# Patient Record
(demographics unavailable — no encounter records)

---

## 2024-11-12 NOTE — REVIEW OF SYSTEMS
[Palpitations] : palpitations [Feeling Fatigued] : not feeling fatigued [SOB] : no shortness of breath [Dyspnea on exertion] : not dyspnea during exertion [Chest Discomfort] : no chest discomfort [Lower Ext Edema] : no extremity edema [Orthopnea] : no orthopnea [PND] : no PND [Syncope] : no syncope [Dizziness] : no dizziness [Easy Bleeding] : no tendency for easy bleeding [Easy Bruising] : no tendency for easy bruising [Negative] : Heme/Lymph

## 2024-11-12 NOTE — HISTORY OF PRESENT ILLNESS
[FreeTextEntry1] : Mr. Paulson is a pleasant 62-year-old male here for follow up today. The patient has a history of Tetralogy of Fallot s/p surgical repair (VSD repair and mechanical replacement of aortic and pulmonic valves, on coumadin)), ventricular tachycardia s/p ICD, LV dysfunction, HTN, asthma, JAYJAY and frequent PVCs s/p ablation.  To summarize, the patient has undergone multiple attempted ablations/EP studies for ventricular arrhythmias in the past without success. He was on tikosyn for his ventricular arrhythmias and had been experiencing palpitations which became worse after using inhalers for asthma. He has had an ICD since 2014.  He wore an outpatient Holter monitor which showed a PVC burden of 7%. TTE reported an EF of 35-40%. Subsequently, the patient underwent ablation for PVCs with me on 8/29/22. Multiple different morphologies were seen during the procedure (left and right bundle morphologies). Only the most frequent morphology was targeted (LBBB superior axis) which was successfully ablated from the inferior portion of the RV apex. The patient wore a Holter monitor after the ablation procedure which showed a lower PVC burden of 1.6% and NSVT. We discussed that if in the future symptoms became worse could pursue a more extensive procedure for PVCs/VT with RV and LV Scar mapping and homogenization to target all PVCs and VT.   Today, the patient reports to be doing well. He notes only rare brief palpitations. He denies dizziness, SOB or chest pain. He is on tikosyn 250 mcg bid which he is tolerating well.

## 2024-11-12 NOTE — DISCUSSION/SUMMARY
[FreeTextEntry1] : In summary, the patient is a 62-year-old male with a history of Tetralogy of Fallot s/p surgical repair (VSD repair and mechanical replacement of aortic and pulmonic valves), ventricular tachycardia s/p ICD, LV dysfunction, HTN, asthma, JAYJAY and frequent PVCs s/p ablation. He is doing well. Device interrogation today reveals normal function with no significant arrhythmias (est PVC burden 1%). Battery is at 3 months to SHUN. Will schedule follow up in 3 months to reassess battery. He will continue tikosyn and carvedilol, as well as coumadin and other HF medications.  [EKG obtained to assist in diagnosis and management of assessed problem(s)] : EKG obtained to assist in diagnosis and management of assessed problem(s)

## 2024-11-12 NOTE — CARDIOLOGY SUMMARY
[de-identified] : 11/12/24: sinus rhythm, RBBB 8/21/24: SR 60bpm, Elliot 242ms, qrs 171, qtc 472ms [de-identified] : 8/26/24: LVEF 25-30% Mild grade 1 LV diastolic dysfunction, LA severely dilated. RA dilated. Mild mitral regurgitation.  12/7/23 TTE:1. Left ventricular cavity is mildly dilated. Abnormal septal motion consistent with right ventricular pacemaker. Left ventricular systolic function is moderately to severely decreased with an ejection fraction of 42 % by Miller's method of disks with an ejection fraction visually estimated at 25 to 30 %. Global left ventricular hypokinesis. 2. The left atrium is normal. 3. The right atrium is normal in size. 4. Mildly enlarged right ventricular cavity size and mildly reduced systolic function. 5. Aortic root at the sinuses of Valsalva is dilated, measuring 4.50 cm (indexed 2.59 cm/m). Ascending aorta diameter is dilated, measuring 4.40 cm (indexed 2.53 cm/m). 6. A mechanical valve replacement present in the aortic position. 7. Thickened mitral valve leaflets. 8. Trace mitral regurgitation. 9. Mechanical valve is present. and [employer] prosthesis in the pulmonic position. 10. Mild tricuspid regurgitation. 11. Estimated pulmonary artery systolic pressure is 46 mmHg, consistent with moderate pulmonary hypertension. 12. Device lead is visualized in the right ventricle. 13. No pericardial effusion seen.

## 2024-11-13 NOTE — HISTORY OF PRESENT ILLNESS
[Never] : never [TextBox_4] : on Trelegy 200 no sig rescue or wheezing Dyspnea baseline never smoker no fevers, chills, chests pain post  cardiac rehab, not requiring 02  Saw Dr Alvares, not good transplant candidate

## 2024-11-13 NOTE — CONSULT LETTER
[Dear  ___] : Dear  [unfilled], [Consult Letter:] : I had the pleasure of evaluating your patient, [unfilled]. [Please see my note below.] : Please see my note below. [Sincerely,] : Sincerely, [FreeTextEntry3] : Everett Flower DO [facility]\par  Pulmonary Critical Care\par  Director Pulmonary Division\par  Medical Director Respiratory Therapy\par  [facility]\par  \par

## 2024-11-13 NOTE — DISCUSSION/SUMMARY
[FreeTextEntry1] : Severe persistent asthma, remodeled component, minimal allergies , JAYJAY ( mild AHI 5)- does not want CPAP Decreased movement of R hemidiaphragm, no paradoxical movement Hx Tetralogy repair, AVR,PVR, on full AC, HFrEF, ventricular ectopy, AICD, on GDMT, and coumadin Currently at baseline, Minimal benefit from prednisone per pt, no sig change on spirometry Continue Trelegy 200 clinically, no sig rescue or exacerbations,  Discussed  biologic, he refuses currently, repeat asthma profile noted No current bronchospasm on exam, normal sp02,  No active rhinitis/GERD- minimal allergies- ( cat and mold- discussed, IgE nl) CT scan 11/22, no sig bronchiectasis or nodules, never smoker, chronic elevated hemidiaphragm Dr Alvares input noted, high risk transplant , followed closely by Cardiology Vaccinations reviewed 4 months or sooner if needed, action plan reviewed, CXR and domitila at follow up

## 2024-11-13 NOTE — PHYSICAL EXAM
[No Acute Distress] : no acute distress [Normal Appearance] : normal appearance [No Resp Distress] : no resp distress [No Acc Muscle Use] : no acc muscle use [Normal Rhythm and Effort] : normal rhythm and effort [Clear to Auscultation Bilaterally] : clear to auscultation bilaterally [No Abnormalities] : no abnormalities [Normal Gait] : normal gait [No Clubbing] : no clubbing [No Cyanosis] : no cyanosis [No Edema] : no edema [FROM] : FROM [Normal Color/ Pigmentation] : normal color/ pigmentation [No Focal Deficits] : no focal deficits [Oriented x3] : oriented x3 [Normal Affect] : normal affect

## 2025-01-10 NOTE — DISCUSSION/SUMMARY
[FreeTextEntry1] : # HFrEF - ETIOLOGY: congenital heart disease s/p 3 prior sternotomies - TOF repair (age 6), [facility] AVR (age 28, 4/11/1990), [facility] MVR (9/24/2002).  -Will order repeat surveillance echo.  - GDMT:  - continue on low-dose Entresto, carvedilol 12.5mg BID, and Jardiance 10mg daily.   -Spironolactone (stopped due to hyperkalemia).  On last visit we reintroduced 25 mg every other day. 2 weeks post lab check with K of 4.6.  However, on routine labs done earlier this week K was elevated to 5.5. HOLD spironolactone.  Repeat blood test on Tuesday.  If level okay can consider spironolactone half tab (12.5mg) every other day. -BP fluctuates from SBP .   -Does not have much room for GDMT up titration.  - DIURETICS: not on standing diuretics.  Euvolemic on exam today.    - LABS: -  1/10/2024 show K 5.7 and Cr 1.71.  Labs prior showed K 6 and Cr 1.56. -5/1/2024: K5.2, creatinine 1.40, proBNP 918 -8/21/2024: K4.6, creatinine 1.27, proBNP 1088. -9/5/2024: K 5.2, Cr 1.48, NT proBNP 878. - 1/8/25: K 5.5, Cr 1.43, pro BNP.  Lipid panel , , HDL 46, LDL 50.  - ADVANCED THERAPIES:   He is has had progressive drop in his ejection fraction despite VT ablation and stable valve function.  He has limitations in his quality of life but no recent HFH.  He is unable to undergo full RHC due to mechanical PVR in place.  He did undergo CPET with high-risk features including low VO2 of 8.4ml/kg/min but preserved Ve/VCO2 slow of 26.  He has a depressed FEV1 of 0.8.  Based on this, he would be in need of dual heart-lung transplant.  However, he would be a high-risk candidate for transplant given that this will be his fourth redo sternotomy.   Also, I am not totally convinced that this would change his current quality of life much.  Evaluated by lung transplant team on 1/22/24 who are in agreement.  This time we will not proceed with opening an advanced therapies evaluation. -Today, he had performed well on a 6-minute walk test walking 911 m with Jason 0. - He saw [facility] team (Dr. Tuttle).  He would not be a candidate for RHC due to mechanical PV.  Follow-up with congenital team annually.  - REHAB: Completed cardiac rehab.  Now doing home exercise regimen.  He has only been able to get his heart rate into the 90s.  However, this was similar on his CPET done in fall 2023.  # VT - s/p ablation on 8/29/22 with Dr. Boles.  Remains on tikosyn.  Stable now from an arrhythmia perspective.    # Chromic remodelled asthma - Last PFTs 2/15/23: FEV 1 0.62, FEV/FVC ratio 57.8%.   - followed by pulmonology Dr. Flower.  On Trelegy.  Has not had to use his albuterol inhaler.  #Hemidiaphragm paralysis -Was seen by lung transplant and pulmonology teams.  No clear role for plication therapy.  Follows closely with pulmonology.  #JAYJAY -Followed by pulmonology.  Mild, patient does not want CPAP.  F/U in 6 months. [EKG obtained to assist in diagnosis and management of assessed problem(s)] : EKG obtained to assist in diagnosis and management of assessed problem(s)

## 2025-01-10 NOTE — PHYSICAL EXAM
[No Edema] : no edema [No Rash] : no rash [Normal] : alert and oriented, normal memory [de-identified] : JVP at clavicle. [de-identified] : mechanical S1 and S2, systolic murmur heard at apex. [de-identified] : warm

## 2025-01-10 NOTE — HISTORY OF PRESENT ILLNESS
[FreeTextEntry1] : 63 yo M with history of congenital heart disease with Tetralogy of Fallot s/p surgical VSD repair at age 6 followed by mechanical AVR at age 28 and subsequent mechanical PVR 8/24/22, ICD implant on 2/28/2001, VT with prior shocks s/p ablation 8/29/2022, HTN, chronic remodeled asthma, and JAYJAY presents for HF follow up.  He underwent initial correction with VSD repair at age 6.  He required aortic valve revision with mechanical AVR placement at [facility] on 4/11/1990 with Dr. Chip Nielsen at age 28.  He later went on to require additional mechanical pulmonic valve replacement with Dr. Fidel Wang on 8/24/2022.  His CHD has been complicated by ventricular arrhythmias.  These began at the age of 18.  ICD was implanted on 2/28/2001. He experienced his first shock approximately 10 years ago while he was golfing [place] a golf course.  He has undergone multiple attempted ablations without success and was maintained on Tikosyn.  However, he had worsening PVC burden (7% on Holter) and underwent ablation on 8/29/2020 (LBBB superior axis morphology ablated).  Post ablation Holter monitor showed reduced burden to 1.6%.  His ejection fraction since 2017 was mildly depressed at 40 to 45%.  However, post ablation, echocardiogram in June 2023 revealing LV function of 20 to 25%.  His echoes have also demonstrated at least moderate RV dysfunction with normal functioning mechanical valves.  7/25/23: I first met Mr. Paulson.  At that time, I had stopped his K supplements and started on spironolactone.  K post change was stable at 4.7 with Cr of 1.46 (around baseline).  He has since started cardiac rehab at [facility].  He had one day with SBP around 80 prior to cardiac rehab that improved to 90s after exercise.   10/9/23: Coreg CR 80mg daily was decreased to 25mg BID.  This had to be further decreased to 12.5mg BID due to hypotension at cardiac rehab.  Repeat echo and CPET were arranged.  11/27/23: CPET reviewed with poor prognostic markers but a suboptimal test.  MPHR 58%, METS 2.4, peak VO2 8.4ml/kg/min, VE/VCO2 slope 26, OUES 1.01L/min (reduced).  He was referred to Dr. King and [facility] team for additional work up who saw him on 1/3/24.   1/17/24: Was seen by congenital team and referred for coronary CTA which came back with minimal CAC score (10).  There was concern of possible hemidiaphragm paresis and the role for possible plication.  Was thought to be too high risk for lung transplant and will follow-up with pulmonology.  No changes to medications were made at that time.  2/21/2024.  At that time he was feeling well.  He unfortunately was unable to reenroll in cardiac rehab.  5/24/24: no meds changes doing well.  He has since bought his own treadmill, stationary bike as well as hand/foot pedal.    8/23/24: spironolactone reintroduced at 25mg every other day.  K 5.2 after starting this.  Since our last visit, patient has been feeling well.  He has had no fluid buildup.  He did a 6MWT in office today and walked for 911m, Jason 0, no stops, HR 72, O2 97%.  He recently got eye laser surgery and is due for an upcoming battery change of his ICD.  He has has EP visit in February.  He has been building his own exercise routine at home.  He is now increase this to: AM: 30 min on treadmill at 3 mph - increased from last visit (8/2024) where he was doing 10 minutes of treadmill at 2.5 mph and then 20mg at 3mph.  PM: 20 minutes of stationary bike and 10 minutes of hand/foot pedal each and light 5lbs weights (newly added). He does over 8000 steps per day.   During the day he is taking multiple walks and is trying to stay active playing pool at his house.  In summer he was walking in his pool with weighted weights.  He has brought a heart rate monitor notes his heart rate does not go up beyond 90s.  The highest was 99 but he does sweat during his work out.  He denies chest pain, palpitations, dyspnea at rest or exertion, orthopnea, leg swelling, changes in appetite, changes in weight, bendopnea, lightheadedness, dizziness, and syncope/pre-syncope.  He continues to sleep on a baseline of 2 pillows.  He has good appetite.  He has not had to use his albuterol inhaler.  He previously worked as a title researcher.  No children. Lives with his brother.

## 2025-01-10 NOTE — CARDIOLOGY SUMMARY
[de-identified] : 1/10/2025: NSR 65, RBBB, possible anterior infarct, nonspecific T wave changes. 4/5/2024: Low voltage, NSR with first-degree AV block, HR 72, left axis deviation, RBBB 9/8/23: Sinus bradycardia HR 40s, LAD, RBBB. 4/26/23: NSR with 1st degree AVB, , RBBB HR 74. [de-identified] : 8/23/2024: LVEF 5025 to 30%, LVEDD 5.0 cm, IVSd 1.3's centimeters, normally functioning valves, sinus Valsalva dilated at 4.5 cm, trace MR. 12/7/2023: LVEF visually 25 to 30% however measured at 42%, LVEDD 4.6 cm, IVSD 0.8 cm, mild RV dysfunction, PASP 46 6/1/2023: LVEF 25-30%, LVEDD 4.8cm, IVSd 1.7cm, PWd 1.4cm, PASP 49mmHg, mild RV dilation and mod - severe RV dysfunction, SOV 4.7cm (indexed 2.6), prox AO 4.60 (indexed 2.54), mild MR, mild TR, normal functioning AVR and PVR. 5/4/22: LVEF 35-40%, LVEDD 4.7cm, IVSd 1.8cm, PWD 1.5cm, PASP 38mmHg, low normal RV function, mild MR, mod Tr, normal functioning AVR and PVR. 2/4/2021: LVEF 35 to 40%, LVEDD 4.2, IVSD 1.5, PWD 1.2 cm, low normal RV function, moderate TR, mild to moderate dilation of aortic root, MVR and AVR functioning well. SHERICE 12/26/2019: intact intra atrial septum with bubble study. 10/29/2017: LVEF 40-45%, LVEDD 4.8cm, mild RV enlargement, normal valve function. [de-identified] : \par  [employer] ICD implanted 2/28/2001\par  Interrogation 2/23/23: no afib,  < 0.1% [de-identified] : \par  [facility] 10/20/2017: no obstructive CAD.\par  RH 10/20/2017: RA 12, RV 40/13 - unable to cross mechanical pulmonic valve.   [de-identified] : 6MWT 1/10/25: 911m, Jason 0, no stops, HR 72, O2 97%.  CPET 11/17/23: MPHR 58%, METS 2.4, peak VO@ 8.4ml/kg/min, VE/VCO2 slope 26, OUES 1.01L/min (reduced), FEV 1 0.82

## 2025-01-10 NOTE — REASON FOR VISIT
[Cardiac Failure] : cardiac failure [Congenital Heart Disease] : congenital heart disease [FreeTextEntry1] : General cardiologist: Dr. Howard EP: Dr. Boles

## 2025-01-10 NOTE — ASSESSMENT
[FreeTextEntry1] : 61 yo M with history of congenital heart disease with Tetralogy of Fallot s/p surgical VSD repair at age 6 followed by mechanical AVR at age 28 and subsequent mechanical PVR 8/24/22, ICD implant on 2/28/2001, VT with prior shocks s/p ablation 8/29/2022, HTN, chronic remodelled asthma, and JAYJAY presents for HF follow up.  He has ACC/AHA stage D CM, with NYHA class II symptoms.

## 2025-02-12 NOTE — REVIEW OF SYSTEMS
[Fever] : no fever [Chills] : no chills [SOB] : no shortness of breath [Syncope] : no syncope [Abdominal Pain] : no abdominal pain [Urinary Frequency] : no change in urinary frequency [Confusion] : no confusion was observed [Easy Bleeding] : no tendency for easy bleeding

## 2025-02-12 NOTE — PHYSICAL EXAM
[Normal] : well developed, well nourished, no acute distress [Normal Conjunctiva] : normal conjunctiva [Normal Venous Pressure] : normal venous pressure [Normal S1, S2] : normal S1, S2 [Clear Lung Fields] : clear lung fields [Good Air Entry] : good air entry [No Respiratory Distress] : no respiratory distress  [Soft] : abdomen soft [Normal Gait] : normal gait [No Edema] : no edema [Moves all extremities] : moves all extremities [No Focal Deficits] : no focal deficits [Normal Speech] : normal speech [Alert and Oriented] : alert and oriented

## 2025-02-12 NOTE — DISCUSSION/SUMMARY
[FreeTextEntry1] : 61M with pmhx Tetralogy of Fallot s/p surgical repair (VSD repair and mechanical replacement of aortic and pulmonic valves) on coumadin, ventricular tachycardia s/p ICD, LV dysfunction, HTN, asthma, JAYJAY and frequent PVCs s/p ablation, presents today for follow up of time to SHUN on ICD. Return to office in 6 weeks to re-eval time to SHUN, or sooner if pt hears device trigger - pt aware and agreeable to call if that happens. Continue tikosyn. Follow up in 6 weeks or sooner prn.  [EKG obtained to assist in diagnosis and management of assessed problem(s)] : EKG obtained to assist in diagnosis and management of assessed problem(s)

## 2025-02-12 NOTE — HISTORY OF PRESENT ILLNESS
[FreeTextEntry1] : 61M with pmhx Tetralogy of Fallot s/p surgical repair (VSD repair and mechanical replacement of aortic and pulmonic valves) on coumadin, ventricular tachycardia s/p ICD, LV dysfunction, HTN, asthma, JAYJAY and frequent PVCs s/p ablation, presents today for follow up of time to SHUN on ICD.  To summarize, the patient has undergone multiple attempted ablations/EP studies for ventricular arrhythmias in the past without success. He was on tikosyn for his ventricular arrhythmias and had been experiencing palpitations which became worse after using inhalers for asthma. He wore an outpatient Holter monitor which showed a PVC burden of 7%. TTE reported an EF of 35-40%. Subsequently, the patient underwent ablation for PVCs on 8/29/22. Multiple different morphologies were seen during the procedure (left and right bundle morphologies). Only the most frequent morphology was targeted (LBBB superior axis) which was successfully ablated from the inferior portion of the RV apex. The patient wore a Holter monitor after the ablation procedure which showed a lower PVC burden of 1.6% and NSVT.  During previous f/u with Dr. Boles discussed that if in future symptoms became worse could pursue a more extensive procedure for PVCs/VT with RV and LV Scar mapping and homogenization to target all PVCs and VT.   Today, pt reports feeling well. Offers no acute complaints.   Dual chamber ICD interrogation reveals normal function. A, RV with adequate sense and pace thresholds. Time to SHUN = 1 month. Total  < 0.1%. . Events significant for one episode of NSVT lasting 5 seconds that terminated without therapy.

## 2025-03-11 NOTE — DISCUSSION/SUMMARY
[FreeTextEntry1] : Severe persistent asthma, remodeled component, minimal allergies , JAYJAY ( mild AHI 5)- does not want CPAP, will repeat sleep study Decreased movement of R hemidiaphragm, no paradoxical movement Hx Tetralogy repair, AVR,PVR, on full AC, HFrEF, ventricular ectopy, AICD, on GDMT Currently at baseline, Minimal benefit from prednisone per pt, no sig change on spirometry Will repeat asthma profile for ? role of biologic Continue Trelegy 200 clinically, no sig rescue or exacerbations,  No current bronchospasm on exam, normal sp02,  No active rhinitis/GERD- minimal allergies- ( cat and mold- discussed, IgE nl in past) CT scan 1/24 no suspicious nodules, CXR 2/25 small r eff, will repeat CXR Mild decrease in EF noted, no edema on exam or change in status, followed closely by cardiology and HF team, on GDMT Dr Alvares input noted, high risk transplant  3 months or sooner if needed, action plan reviewed, will call with CXR and repeat asthma profile

## 2025-03-11 NOTE — CONSULT LETTER
[Dear  ___] : Dear  [unfilled], [Consult Letter:] : I had the pleasure of evaluating your patient, [unfilled]. [Please see my note below.] : Please see my note below. [Sincerely,] : Sincerely, [FreeTextEntry3] : Everett Flower DO [facility]\par  Pulmonary Critical Care\par  Director Pulmonary Division\par  Medical Director Respiratory Therapy\par  [facility]\par  \par

## 2025-03-11 NOTE — HISTORY OF PRESENT ILLNESS
[Never] : never [TextBox_4] : on Trelegy 200 no sig rescue or wheezing Dyspnea near baseline never smoker no fevers, chills, chests pain post  cardiac rehab, not requiring 02  Saw Dr Alvares, not good transplant candidate

## 2025-03-26 NOTE — PHYSICAL EXAM
[Well Developed] : well developed [No Acute Distress] : no acute distress [Normal Conjunctiva] : normal conjunctiva [Normal Venous Pressure] : normal venous pressure [Carotid Bruit] : carotid bruit [Normal S1, S2] : normal S1, S2 [S4] : S4 [Murmur] : murmur [Clear Lung Fields] : clear lung fields [Normal Bowel Sounds] : normal bowel sounds [Normal Gait] : normal gait [No Edema] : no edema [No Rash] : no rash [Normal] : alert and oriented, normal memory [de-identified] : Borderline obese [de-identified] : right [de-identified] : 1/6 RACHEL [de-identified] : I-II/VI systolic murmur.  "Crisp" closure sounds (AV + PV).

## 2025-03-26 NOTE — REASON FOR VISIT
[FreeTextEntry1] : Mr. Paulson is a pleasant 62-year-old white male with a past medical history significant for congenital heart disease (tetralogy of Fallot), who is status-post VSD repair in 1968 ([facility]/Dr. Gaines)  followed by mechanical AVR with a #27 [employer] prosthesis for aortic regurgitation in 1990 ([facility]/Dr. Nielsen), dual-chamber AICD implantation in 2001 followed by PVR with a mechanical #29 [employer] prosthesis for pulmonary regurgitation ([facility]/Dr. Clifford), as well as frequent PVCs (7% burden), not well controlled Tikosyn and noted LV dysfunction, underwent PVC ablation in August of 2022 with a reduction of PVC burden down to 1.6%, who presents for follow up evaluation.

## 2025-03-26 NOTE — REASON FOR VISIT
[FreeTextEntry1] : Mr. Paulson is a pleasant 62-year-old white male with a past medical history significant for congenital heart disease (tetralogy of Fallot), who is status-post VSD repair in 1968 ([facility]/Dr. Gaines)  followed by mechanical AVR with a #27 [employer] prosthesis for aortic regurgitation in 1990 ([facility]/Dr. Nielsen), dual-chamber AICD implantation in 2001 followed by PVR with a mechanical #29 [employer] prosthesis for pulmonary regurgitation ([facility]/Dr. Clifford), as well as frequent PVCs (7% burden), not well controlled Tikosyn and noted LV dysfunction, underwent PVC ablation in August of 2022 with a reduction of PVC burden down to 1.6%, who presents for follow up evaluation.

## 2025-03-26 NOTE — PHYSICAL EXAM
[Well Developed] : well developed [No Acute Distress] : no acute distress [Normal Conjunctiva] : normal conjunctiva [Normal Venous Pressure] : normal venous pressure [Carotid Bruit] : carotid bruit [Normal S1, S2] : normal S1, S2 [S4] : S4 [Murmur] : murmur [Clear Lung Fields] : clear lung fields [Normal Bowel Sounds] : normal bowel sounds [Normal Gait] : normal gait [No Edema] : no edema [No Rash] : no rash [Normal] : alert and oriented, normal memory [de-identified] : Borderline obese [de-identified] : right [de-identified] : 1/6 RACHEL [de-identified] : I-II/VI systolic murmur.  "Crisp" closure sounds (AV + PV).

## 2025-03-26 NOTE — HISTORY OF PRESENT ILLNESS
[FreeTextEntry1] : Mr. Paulson presents today for follow up evaluation.  At this time, he denies chest pain, or shortness of breath but does complain of palpitations which he describes as a "fluttering-like sensation".

## 2025-03-26 NOTE — ASSESSMENT
[FreeTextEntry1] : 1. EKG today reveals sinus rhythm at 75bpm. First degree AV block. There are occasional PVCs.  There is typical right bundle branch block. Left axis deviation. Nonspecific ST-T changes.   2. Congenital heart disease (Tetralogy of Fallot) status post VSD repair as well as AVR and PVR with gradual but progressive left ventricular dysfunction: Although clinically compensated at this time, the patient remains brittle. He apparently is not a candidate for heart/lung transplant given his prior history of multiple sternotomies. Now management will consist of aggressive medical therapy and close clinical follow up.   3. Asthma (severe, persistent): Appears to be clinically stable at this time and followed closely by Pulmonary.   4. We will assess 30-day ambulatory event monitor for palpitations/fluttering and advise further.

## 2025-03-26 NOTE — HISTORY OF PRESENT ILLNESS
[FreeTextEntry1] : Mr. Paluson presents today for follow up evaluation.  At this time, he denies chest pain, or shortness of breath but does complain of palpitations which he describes as a "fluttering-like sensation".

## 2025-04-02 NOTE — PHYSICAL EXAM
[Normal] : well developed, well nourished, no acute distress [Good Air Entry] : good air entry [No Respiratory Distress] : no respiratory distress  [Moves all extremities] : moves all extremities [No Focal Deficits] : no focal deficits [Alert and Oriented] : alert and oriented [Normal memory] : normal memory

## 2025-04-02 NOTE — DISCUSSION/SUMMARY
[FreeTextEntry1] : 61M with pmhx Tetralogy of Fallot s/p surgical repair (VSD repair and mechanical replacement of aortic and pulmonic valves) on coumadin, ventricular tachycardia s/p ICD, LV dysfunction, HTN, asthma, JAYJAY and frequent PVCs s/p ablation, presents today for follow up of time to SHUN on ICD. One month to RRT. WIll proceed with gen change. F/u post procedurally.  [EKG obtained to assist in diagnosis and management of assessed problem(s)] : EKG obtained to assist in diagnosis and management of assessed problem(s)

## 2025-04-02 NOTE — HISTORY OF PRESENT ILLNESS
[FreeTextEntry1] : 61M with pmhx Tetralogy of Fallot s/p surgical repair (VSD repair and mechanical replacement of aortic and pulmonic valves) on coumadin, ventricular tachycardia s/p ICD, LV dysfunction, HTN, asthma, JAYJAY and frequent PVCs s/p ablation, presents today for follow up of time to SHUN on ICD.  To summarize, the patient has undergone multiple attempted ablations/EP studies for ventricular arrhythmias in the past without success. He was on tikosyn for his ventricular arrhythmias and had been experiencing palpitations which became worse after using inhalers for asthma. He wore an outpatient Holter monitor which showed a PVC burden of 7%. TTE reported an EF of 35-40%. Subsequently, the patient underwent ablation for PVCs on 8/29/22. Multiple different morphologies were seen during the procedure (left and right bundle morphologies). Only the most frequent morphology was targeted (LBBB superior axis) which was successfully ablated from the inferior portion of the RV apex. The patient wore a Holter monitor after the ablation procedure which showed a lower PVC burden of 1.6% and NSVT.  During previous f/u with Dr. Boles discussed that if in future symptoms became worse could pursue a more extensive procedure for PVCs/VT with RV and LV Scar mapping and homogenization to target all PVCs and VT.  Today, pt reports feeling well. Offers no acute complaints. Battery at one month to RRT.

## 2025-04-22 NOTE — DISCUSSION/SUMMARY
[FreeTextEntry1] : 62 M with pmhx Tetralogy of Fallot s/p surgical repair (VSD repair and mechanical replacement of aortic and pulmonic valves) on coumadin, ventricular tachycardia s/p ICD, LV dysfunction, HTN, asthma, JAYJAY and frequent PVCs s/p ablation, presents today s/p MDT ICD generator change on 4/4/25 with Dr. Boles   Patient reported to the office today s/p MDT PPM generator change. He reports to be feeling well since the procedure and denies any symptoms of arrhythmia. ICD interrogation today reveals normal function, without any new arrhythmia events. LACW incision is now healed well without and s/s of pocket infection or complications.    Recommendation -Continue remote monitoring of ICD- carelink odin set up in office today -Continue current at home medications and GDMT therapy -EP f/u in 6 months or sooner PRN   [EKG obtained to assist in diagnosis and management of assessed problem(s)] : EKG obtained to assist in diagnosis and management of assessed problem(s)

## 2025-04-22 NOTE — HISTORY OF PRESENT ILLNESS
[FreeTextEntry1] : 62 M with pmhx Tetralogy of Fallot s/p surgical repair (VSD repair and mechanical replacement of aortic and pulmonic valves) on coumadin, ventricular tachycardia s/p ICD, LV dysfunction, HTN, asthma, JAYJAY and frequent PVCs s/p ablation, presents today s/p MDT ICD Generator change   To summarize, the patient has undergone multiple attempted ablations/EP studies for ventricular arrhythmias in the past without success. He was on tikosyn for his ventricular arrhythmias and had been experiencing palpitations which became worse after using inhalers for asthma. He wore an outpatient Holter monitor which showed a PVC burden of 7%. TTE reported an EF of 35-40%. Subsequently, the patient underwent ablation for PVCs on 8/29/22. Multiple different morphologies were seen during the procedure (left and right bundle morphologies). Only the most frequent morphology was targeted (LBBB superior axis) which was successfully ablated from the inferior portion of the RV apex. The patient wore a Holter monitor after the ablation procedure which showed a lower PVC burden of 1.6% and NSVT.  During previous f/u with Dr. Boles discussed that if in future symptoms became worse could pursue a more extensive procedure for PVCs/VT with RV and LV Scar mapping and homogenization to target all PVCs and VT.   Patient presents to the office today s/p MDT ICD generator change on 4/4/25. He reports that since the procedure he has been feeling well and denies symptoms of arrhythmia without c/o palpitations, chest pain/tightness, dizziness, syncope or near syncope.  Left chest wall pacemaker incision is well approximated without erythema, edema, drainage, exudate or signs of pocket infection. Remaining glue removed from surrounding area.   MDT ICD interrogation today reveals normal function. Battery life ~ 12.7 years to SHUN.  Adequate sensing and pacing thresholds. Stable lead impedance. A-Paced 4.0% , V-pacing <0.1% , No new arrhythmia events noted or shocks given.   EKG Today reveals: SR 68 bpm

## 2025-04-22 NOTE — PHYSICAL EXAM
[Normal] : well developed, well nourished, no acute distress [No Acute Distress] : no acute distress [Normal S1, S2] : normal S1, S2 [Clear Lung Fields] : clear lung fields [No Respiratory Distress] : no respiratory distress  [No Edema] : no edema [No Rash] : no rash [Moves all extremities] : moves all extremities [No Focal Deficits] : no focal deficits [Alert and Oriented] : alert and oriented [Normal memory] : normal memory

## 2025-04-22 NOTE — CARDIOLOGY SUMMARY
[de-identified] : 4/22/25: SR HR 68 bpm  04/02/25: SR with RBBB [de-identified] : 1/28/25: 1. Left ventricular cavity is normal in size. Abnormal septal motion consistent with right ventricular pacemaker. Left ventricular systolic function is severely decreased with an ejection fraction visually estimated at 20 to 25 %. Global left ventricular hypokinesis. 2. Moderate left ventricular hypertrophy. 3. Unable to assess left ventricular diastolic function due to insufficient data. 4. Based on visual assessment, the right ventricle appears mildly enlarged. reduced right ventricular systolic function. 5. Left atrium was not well visualized and LA volume could not be calculated. 6. Right atrium was not well visualized. 7. Device lead is visualized in the right heart. 8. Aortic arch is not well visualized. 9. A bileaflet mechanical valve valve replacement is present in the aortic position The prosthetic valve has normal function Degeneration of the aortic valve prosthesis. 10. Trace mitral regurgitation. 11. Thickened mitral valve leaflets. 12. Mechanical valve is present. and [employer] prosthesis in the pulmonic position. 13. Trace tricuspid regurgitation. 14. Normal pulmonary artery pressure. 15. No pericardial effusion seen.

## 2025-04-22 NOTE — REVIEW OF SYSTEMS
[Negative] : Heme/Lymph [Feeling Fatigued] : not feeling fatigued [SOB] : no shortness of breath [Dyspnea on exertion] : not dyspnea during exertion [Chest Discomfort] : no chest discomfort [Lower Ext Edema] : no extremity edema [Palpitations] : no palpitations [PND] : no PND [Syncope] : no syncope [Cough] : no cough [Rash] : no rash [Dizziness] : no dizziness [Easy Bleeding] : no tendency for easy bleeding [Easy Bruising] : no tendency for easy bruising [FreeTextEntry5] : see HPI

## 2025-05-20 NOTE — CARDIOLOGY SUMMARY
[de-identified] : Sinus rhythm at 67 bpm.  First degree AV-block.  PVC.  Right bundle branch block.  Left axis deviation.  Non-specific ST-T changes.

## 2025-05-20 NOTE — PHYSICAL EXAM
[Well Developed] : well developed [Well Nourished] : well nourished [No Acute Distress] : no acute distress [Normal Conjunctiva] : normal conjunctiva [Normal Venous Pressure] : normal venous pressure [No Carotid Bruit] : no carotid bruit [Normal S1, S2] : normal S1, S2 [No Rub] : no rub [No Gallop] : no gallop [Clear Lung Fields] : clear lung fields [No Respiratory Distress] : no respiratory distress  [Normal Bowel Sounds] : normal bowel sounds [Normal Gait] : normal gait [No Edema] : no edema [No Rash] : no rash [No Skin Lesions] : no skin lesions [Moves all extremities] : moves all extremities [No Focal Deficits] : no focal deficits [Normal Speech] : normal speech [Alert and Oriented] : alert and oriented [Normal memory] : normal memory [S4] : S4 [de-identified] : Overweight [de-identified] : I-II/VI systolic murmur.  "Crisp" closure sounds (AV + PV)

## 2025-05-20 NOTE — PHYSICAL EXAM
[Well Developed] : well developed [Well Nourished] : well nourished [No Acute Distress] : no acute distress [Normal Conjunctiva] : normal conjunctiva [Normal Venous Pressure] : normal venous pressure [No Carotid Bruit] : no carotid bruit [Normal S1, S2] : normal S1, S2 [No Rub] : no rub [No Gallop] : no gallop [Clear Lung Fields] : clear lung fields [No Respiratory Distress] : no respiratory distress  [Normal Bowel Sounds] : normal bowel sounds [Normal Gait] : normal gait [No Edema] : no edema [No Rash] : no rash [No Skin Lesions] : no skin lesions [Moves all extremities] : moves all extremities [No Focal Deficits] : no focal deficits [Normal Speech] : normal speech [Alert and Oriented] : alert and oriented [Normal memory] : normal memory [S4] : S4 [de-identified] : Overweight [de-identified] : I-II/VI systolic murmur.  "Crisp" closure sounds (AV + PV)

## 2025-05-20 NOTE — CARDIOLOGY SUMMARY
[de-identified] : Sinus rhythm at 67 bpm.  First degree AV-block.  PVC.  Right bundle branch block.  Left axis deviation.  Non-specific ST-T changes.

## 2025-05-20 NOTE — REASON FOR VISIT
[FreeTextEntry1] : michelle Paulson is a pleasant 62-year-old white male with a past medical history significant for congenital heart disease (tetralogy of Fallot), who is status-post VSD repair in 1968 ([facility]/Dr. Gaines) followed by mechanical AVR with a #27 [employer] prosthesis for aortic regurgitation in 1990 ([facility]/Dr. Nielsen), dual-chamber AICD implantation in 2001 followed by PVR with a mechanical #29 [employer] prosthesis for pulmonary regurgitation ([facility]/Dr. Clifford), as well as frequent PVCs (7% burden), not well controlled Tikosyn and noted LV dysfunction, underwent PVC ablation in August of 2022 with a reduction of PVC burden down to 1.6%, who presents for follow up evaluation.

## 2025-05-20 NOTE — HISTORY OF PRESENT ILLNESS
[FreeTextEntry1] : Mr. Paulson presents today feeling well.  He is currently without chest pain, shortness of breath, lightheadedness or syncope.  He has occasional palpitations.  He exercises for 30 minutes daily on the treadmill.  He had ICD generator changed 4/4/2025 with Dr. Boles.

## 2025-05-20 NOTE — REASON FOR VISIT
[FreeTextEntry1] : michelle Paulson is a pleasant 62-year-old white male with a past medical history significant for congenital heart disease (tetralogy of Fallot), who is status-post VSD repair in 1968 ([facility]/Dr. Gaines) followed by mechanical AVR with a #27 [employer] prosthesis for aortic regurgitation in 1990 ([facility]/Dr. Nielsen), dual-chamber AICD implantation in 2001 followed by PVR with a mechanical #29 [employer] prosthesis for pulmonary regurgitation ([facility]/Dr. Clifford), as well as frequent PVCs (7% burden), not well controlled Tikosyn and noted LV dysfunction, underwent PVC ablation in August of 2022 with a reduction of PVC burden down to 1.6%, who presents for follow up evaluation.

## 2025-05-20 NOTE — DISCUSSION/SUMMARY
[FreeTextEntry1] : 1 - Hypertension:  blood pressure well controlled on current medications.  Follow low sodium diet and weight loss.  2 - Congenital heart disease (Tetralogy of Fallot) status-post VSD repair as well as AVR and PVR with gradual but progressive left ventricular dysfunction:  stable at this time.  However, Mr. Paulson is not a candidate for heart/lung transplant given his prior history of multiple sternotomies. Now management will consist of aggressive medical therapy and close clinical follow up.  3 - Palpitations/fluttering:  patient underwent 30-day ambulatory event monitor which revealed. baseline rhythm is sinus with an average heart rate of60 bpm (max 156, min 38).  No pauses.  <0.1% AF was found during the monitoring period (Patient currently on warfarin therapy - managed by PCP).  SVT occurred 2 times with fasted run 139 bpm.  VT occurred 9 time with fastest run 156 bpm.  Heart block occurred 46 times, the most severe 1st degree, slowest 50 bpm.  PVC burden 7%.  PAC burden 2 %.   Patient has follow-up visit with Dr. Boles in July.  4 - Hyperlipidemia:  cholesterol 123, triglycerides 99, HDL 47, LDL 58.  Continue Rosuvastatin 5mg daily.  Follow low fat, low cholesterol, low carb diet.  Increase physical activity/exercise.  Fasting blood work prior to follow-up visit.  5 - Recent labs (5/10/2025):  potassium 5, BUN 22, creatinine 1.29, fasting glucose 102, HgA1c 6, PSA 0.77.  6 - Follow up with Dr. Howard in 3-4 months.  [EKG obtained to assist in diagnosis and management of assessed problem(s)] : EKG obtained to assist in diagnosis and management of assessed problem(s)

## 2025-07-09 NOTE — CONSULT LETTER
[Dear  ___] : Dear  [unfilled], [Consult Letter:] : I had the pleasure of evaluating your patient, [unfilled]. [Please see my note below.] : Please see my note below. [Consult Closing:] : Thank you very much for allowing me to participate in the care of this patient.  If you have any questions, please do not hesitate to contact me. [Sincerely,] : Sincerely, [DrAlda  ___] : Dr. MONDRAGON [FreeTextEntry3] : Everett Flower DO [facility]\par  Pulmonary Critical Care\par  Director Pulmonary Division\par  Medical Director Respiratory Therapy\par  [facility]\par  \par

## 2025-07-09 NOTE — PROCEDURE
[FreeTextEntry1] : no change in severe air flow obstruction from 7/24 CXR 2/25, small R effusion CXR 7/25 neg

## 2025-07-09 NOTE — DISCUSSION/SUMMARY
[FreeTextEntry1] : Severe persistent asthma, remodeled component, minimal allergies , JAYJAY ( mild AHI 6)- awaiting autopap Decreased movement of R hemidiaphragm, no paradoxical movement Hx Tetralogy repair, AVR,PVR, on full AC, HFrEF, ventricular ectopy, AICD, on GDMT Currently at baseline, Minimal benefit from prednisone per pt, no sig change on spirometry Continue Trelegy 200 clinically, no sig rescue or exacerbations,  No current bronchospasm on exam, normal sp02,  No active rhinitis/GERD- minimal allergies- ( cat and mold- discussed, IgE nl in past) CT scan 1/24 no suspicious nodules, CXR 7/25 negative Mild decrease in EF noted, no edema on exam or change in status, followed closely by cardiology and HF team, on GDMT Dr Alvares input noted, high risk transplant  3 months or sooner if needed, repeat CBC with differential to eval Biologic role, pt is equivocal since he has been stable despite his severity

## 2025-07-11 NOTE — CARDIOLOGY SUMMARY
[de-identified] : 1/10/2025: NSR 65, RBBB, possible anterior infarct, nonspecific T wave changes. 4/5/2024: Low voltage, NSR with first-degree AV block, HR 72, left axis deviation, RBBB 9/8/23: Sinus bradycardia HR 40s, LAD, RBBB. 4/26/23: NSR with 1st degree AVB, , RBBB HR 74. [de-identified] : 8/23/2024: LVEF 5025 to 30%, LVEDD 5.0 cm, IVSd 1.3's centimeters, normally functioning valves, sinus Valsalva dilated at 4.5 cm, trace MR. 12/7/2023: LVEF visually 25 to 30% however measured at 42%, LVEDD 4.6 cm, IVSD 0.8 cm, mild RV dysfunction, PASP 46 6/1/2023: LVEF 25-30%, LVEDD 4.8cm, IVSd 1.7cm, PWd 1.4cm, PASP 49mmHg, mild RV dilation and mod - severe RV dysfunction, SOV 4.7cm (indexed 2.6), prox AO 4.60 (indexed 2.54), mild MR, mild TR, normal functioning AVR and PVR. 5/4/22: LVEF 35-40%, LVEDD 4.7cm, IVSd 1.8cm, PWD 1.5cm, PASP 38mmHg, low normal RV function, mild MR, mod Tr, normal functioning AVR and PVR. 2/4/2021: LVEF 35 to 40%, LVEDD 4.2, IVSD 1.5, PWD 1.2 cm, low normal RV function, moderate TR, mild to moderate dilation of aortic root, MVR and AVR functioning well. SHERICE 12/26/2019: intact intra atrial septum with bubble study. 10/29/2017: LVEF 40-45%, LVEDD 4.8cm, mild RV enlargement, normal valve function. [de-identified] : \par  [employer] ICD implanted 2/28/2001\par  Interrogation 2/23/23: no afib,  < 0.1% [de-identified] : \par  [facility] 10/20/2017: no obstructive CAD.\par  RH 10/20/2017: RA 12, RV 40/13 - unable to cross mechanical pulmonic valve.   [de-identified] : 6MWT 1/10/25: 911m, Jason 0, no stops, HR 72, O2 97%.  CPET 11/17/23: MPHR 58%, METS 2.4, peak VO@ 8.4ml/kg/min, VE/VCO2 slope 26, OUES 1.01L/min (reduced), FEV 1 0.82

## 2025-07-11 NOTE — HISTORY OF PRESENT ILLNESS
[FreeTextEntry1] : 61 yo M with history of congenital heart disease with Tetralogy of Fallot s/p surgical VSD repair at age 6 followed by mechanical AVR at age 28 and subsequent mechanical PVR 8/24/22, ICD implant on 2/28/2001, VT with prior shocks s/p ablation 8/29/2022, HTN, chronic remodeled asthma, and JAYJAY presents for HF follow up.  He underwent initial correction with VSD repair at age 6.  He required aortic valve revision with mechanical AVR placement at [facility] on 4/11/1990 with Dr. Chip Nielsen at age 28.  He later went on to require additional mechanical pulmonic valve replacement with Dr. Fidel Wang on 8/24/2022.  His CHD has been complicated by ventricular arrhythmias.  These began at the age of 18.  ICD was implanted on 2/28/2001. He experienced his first shock approximately 10 years ago while he was golfing [place] a golf course.  He has undergone multiple attempted ablations without success and was maintained on Tikosyn.  However, he had worsening PVC burden (7% on Holter) and underwent ablation on 8/29/2020 (LBBB superior axis morphology ablated).  Post ablation Holter monitor showed reduced burden to 1.6%.  His ejection fraction since 2017 was mildly depressed at 40 to 45%.  However, post ablation, echocardiogram in June 2023 revealing LV function of 20 to 25%.  His echoes have also demonstrated at least moderate RV dysfunction with normal functioning mechanical valves.  7/25/23: I first met Mr. Paulson.  At that time, I had stopped his K supplements and started on spironolactone.  K post change was stable at 4.7 with Cr of 1.46 (around baseline).  He has since started cardiac rehab at [facility].  He had one day with SBP around 80 prior to cardiac rehab that improved to 90s after exercise.   10/9/23: Coreg CR 80mg daily was decreased to 25mg BID.  This had to be further decreased to 12.5mg BID due to hypotension at cardiac rehab.  Repeat echo and CPET were arranged.  11/27/23: CPET reviewed with poor prognostic markers but a suboptimal test.  MPHR 58%, METS 2.4, peak VO2 8.4ml/kg/min, VE/VCO2 slope 26, OUES 1.01L/min (reduced).  He was referred to Dr. King and [facility] team for additional work up who saw him on 1/3/24.   1/17/24: Was seen by congenital team and referred for coronary CTA which came back with minimal CAC score (10).  There was concern of possible hemidiaphragm paresis and the role for possible plication.  Was thought to be too high risk for lung transplant and will follow-up with pulmonology.  No changes to medications were made at that time.  2/21/2024.  At that time he was feeling well.  He unfortunately was unable to reenroll in cardiac rehab.  5/24/24: no meds changes doing well.  He has since bought his own treadmill, stationary bike as well as hand/foot pedal.    8/23/24: spironolactone reintroduced at 25mg every other day.  K 5.2 after starting this.  1/10/15: 6MWTwalked for 911m, Jason 0, no stops, HR 72, O2 97%.    Since our last visit, patient underwent ICD generator change on 4/4/2024.  This was uneventful.  Patient has noticed some increased fluttering and still has short runs of SVT and VT.  Following with EP team.  Holter monitor from May 2025 shows increase in PVC burden to 7%.  He is seeing EP again on 7/22/2025.  He does occassionally feel "fluttering" in his chest but no chest pain.  He denies dyspnea at rest or exertion, orthopnea, leg swelling, changes in appetite, changes in weight, bendopnea, lightheadedness, dizziness, and syncope/pre-syncope.  He continues to sleep on a baseline of 2 pillows.  He has good appetite.  He has not had to use his albuterol inhaler.  He still robustly active at home.  He attempts to walk 4 miles per day.  He does a combination of hand and foot pedals as well as stationary bike.  During the day he is taking multiple walks and is trying to stay active playing pool at his house.  In summer he was walking in his pool with weighted weights.  He has brought a heart rate monitor notes his heart rate does not go up beyond 90s.  The highest was 99 but he does sweat during his work out.  He previously worked as a title researcher.  No children. Lives with his brother.

## 2025-07-11 NOTE — ASSESSMENT
[FreeTextEntry1] : 63 yo M with history of congenital heart disease with Tetralogy of Fallot s/p surgical VSD repair at age 6 followed by mechanical AVR at age 28 and subsequent mechanical PVR 8/24/22, ICD implant on 2/28/2001, VT with prior shocks s/p ablation 8/29/2022, HTN, chronic remodelled asthma, and JAYJAY presents for HF follow up.  He has ACC/AHA stage D CM, with NYHA class II symptoms.

## 2025-07-11 NOTE — PHYSICAL EXAM
[No Edema] : no edema [No Rash] : no rash [Normal] : alert and oriented, normal memory [de-identified] : JVP at clavicle. [de-identified] : mechanical S1 and S2, systolic murmur heard at apex. [de-identified] : warm

## 2025-07-11 NOTE — DISCUSSION/SUMMARY
[FreeTextEntry1] : # HFr[facility] - ETIOLOGY: congenital heart disease s/p 3 prior sternotomies - TOF repair (age 6), [facility] AVR (age 28, 4/11/1990), [facility] MVR (9/24/2002).  -Will order repeat surveillance echo.  - GDMT:  - continue on low-dose Entresto, carvedilol 12.5mg BID, and Jardiance 10mg daily.   - No longer on spironolactone due to hyperkalemia and fluctuating renal function.  Follows with nephrolology. -BP fluctuates from SBP .   -Does not have much room for GDMT up titration.  - DIURETICS: not on standing diuretics.  Euvolemic on exam today.    - LABS: -  1/10/2024 show K 5.7 and Cr 1.71.  Labs prior showed K 6 and Cr 1.56. -5/1/2024: K5.2, creatinine 1.40, proBNP 918 -8/21/2024: K4.6, creatinine 1.27, proBNP 1088. -9/5/2024: K 5.2, Cr 1.48, NT proBNP 878. - 1/8/25: K 5.5, Cr 1.43, pro BNP.  Lipid panel , , HDL 46, LDL 50. - 7/8/2025: K5.0, creatinine 1.54, EGFR 51, normal liver enzymes. - Lipid panel: , , LDL 47, HDL 44.  - ADVANCED THERAPIES:   He is has had progressive drop in his ejection fraction despite VT ablation and stable valve function.  He has limitations in his quality of life but no recent HFH.  He is unable to undergo full RHC due to mechanical PVR in place.  He did undergo CPET with high-risk features including low VO2 of 8.4ml/kg/min but preserved Ve/VCO2 slow of 26.  He has a depressed FEV1 of 0.8.  Based on this, he would be in need of dual heart-lung transplant.  However, he would be a high-risk candidate for transplant given that this will be his fourth redo sternotomy.   Also, I am not totally convinced that this would change his current quality of life much.  Evaluated by lung transplant team on 1/22/24 who are in agreement.  This time we will not proceed with opening an advanced therapies evaluation. -6-minute walk test walking 1/10/25 with 911 m with Jason 0. - He saw [facility] team (Dr. Tuttle).  He would not be a candidate for RHC due to mechanical PV.  Follow-up with congenital team annually. - we discussed repeating a CPET in the future.  Patient is agreeable.   - REHAB: Completed cardiac rehab.  Now doing home exercise regimen.  He has only been able to get his heart rate into the 90s.  However, this was similar on his CPET done in fall 2023.    # VT - s/p ablation on 8/29/22 with Dr. Boles.  Remains on tikosyn.  Stable now from an arrhythmia perspective. However, still has sort SVT and VT runs with PVC burden of 7%.  Seeing EP on 7/22/25.    # Chromic remodelled asthma - followed by pulmonology Dr. Flower.  On Trelegy.  Has not had to use his albuterol inhaler.  #Hemidiaphragm paralysis -Was seen by lung transplant and pulmonology teams.  No clear role for plication therapy.  Follows closely with pulmonology.  #JAYJAY -Followed by pulmonology.  Mild - awaiting autopap  F/U in 6 months.

## 2025-07-11 NOTE — END OF VISIT
[Time Spent: ___ minutes] : I have spent [unfilled] minutes of time on the encounter which excludes teaching and separately reported services.
Discussed possibility of meals on wheels if pt starting on po diet with NP.

## 2025-07-22 NOTE — DISCUSSION/SUMMARY
[FreeTextEntry1] : 61M with pmhx Tetralogy of Fallot s/p surgical repair (VSD repair and mechanical replacement of aortic and pulmonic valves) on coumadin, ventricular tachycardia s/p ICD, LV dysfunction, HTN, asthma, JAYJAY and frequent PVCs s/p ablation, presents today for follow up.   - Device is functioning normally; no changes were made today. No sig arrhythmias. PVC counters around 55/hr - Holter with 7% PVC burden and minimal symptoms. Should he develop increasing burden or sxs of decompensated HF would consider suppression strategies; cont coreg and tikosyn - Cont coumadin (artificial valves) - Remote monitoring in place (now follows with our office for DVC). EP f/u in 6 months or sooner PRN.  [EKG obtained to assist in diagnosis and management of assessed problem(s)] : EKG obtained to assist in diagnosis and management of assessed problem(s)

## 2025-07-22 NOTE — HISTORY OF PRESENT ILLNESS
[FreeTextEntry1] : 62M with pmhx Tetralogy of Fallot s/p surgical repair (VSD repair and mechanical replacement of aortic and pulmonic valves) on coumadin, ventricular tachycardia s/p ICD, LV dysfunction, HTN, asthma, JAYJAY and frequent PVCs s/p ablation, presents today for follow up of time to SHUN on ICD.  To summarize, the patient has undergone multiple attempted ablations/EP studies for ventricular arrhythmias in the past without success. He was on tikosyn for his ventricular arrhythmias and had been experiencing palpitations which became worse after using inhalers for asthma. He wore an outpatient Holter monitor which showed a PVC burden of 7%. TTE reported an EF of 35-40%. Subsequently, the patient underwent ablation for PVCs on 8/29/22. Multiple different morphologies were seen during the procedure (left and right bundle morphologies). Only the most frequent morphology was targeted (LBBB superior axis) which was successfully ablated from the inferior portion of the RV apex. The patient wore a Holter monitor after the ablation procedure which showed a lower PVC burden of 1.6% and NSVT.  During previous f/u with Dr. Boles discussed that if in future symptoms became worse could pursue a more extensive procedure for PVCs/VT with RV and LV Scar mapping and homogenization to target all PVCs and VT.  Pt offers no acute complaints today. Has intermittent palpitations that are brief and not very bothersome. Denies chest pain, sob, ESQUIVEL, syncope, lightheadedness, dizziness. Remains robustly active with exercise >4 miles/day of walking and biking.  Tolerating AC without significant bleeding concerns, bruising or falls.   Interval holter from may 2025 with 7% PVC burden   Device interrogation today reveals normal function. Battery life 12.4 years to SHUN.  Adequate sensing and pacing thresholds. Stable lead impedance. AP 8.1%  <0.1%. One, two second episodes binned as NSVT more s/w SVT